# Patient Record
Sex: MALE | Race: WHITE | NOT HISPANIC OR LATINO | URBAN - METROPOLITAN AREA
[De-identification: names, ages, dates, MRNs, and addresses within clinical notes are randomized per-mention and may not be internally consistent; named-entity substitution may affect disease eponyms.]

---

## 2018-04-03 ENCOUNTER — HISTORICAL (OUTPATIENT)
Dept: RADIOLOGY | Facility: HOSPITAL | Age: 73
End: 2018-04-03

## 2019-01-28 ENCOUNTER — HISTORICAL (OUTPATIENT)
Dept: RADIOLOGY | Facility: HOSPITAL | Age: 74
End: 2019-01-28

## 2019-04-15 ENCOUNTER — HISTORICAL (OUTPATIENT)
Dept: ADMINISTRATIVE | Facility: HOSPITAL | Age: 74
End: 2019-04-15

## 2019-04-22 ENCOUNTER — HISTORICAL (OUTPATIENT)
Dept: ADMINISTRATIVE | Facility: HOSPITAL | Age: 74
End: 2019-04-22

## 2019-05-30 ENCOUNTER — HISTORICAL (OUTPATIENT)
Dept: ADMINISTRATIVE | Facility: HOSPITAL | Age: 74
End: 2019-05-30

## 2019-11-22 ENCOUNTER — HISTORICAL (OUTPATIENT)
Dept: RADIOLOGY | Facility: HOSPITAL | Age: 74
End: 2019-11-22

## 2020-02-27 ENCOUNTER — HISTORICAL (OUTPATIENT)
Dept: ADMINISTRATIVE | Facility: HOSPITAL | Age: 75
End: 2020-02-27

## 2020-03-10 ENCOUNTER — HISTORICAL (OUTPATIENT)
Dept: RADIOLOGY | Facility: HOSPITAL | Age: 75
End: 2020-03-10

## 2020-06-16 ENCOUNTER — HISTORICAL (OUTPATIENT)
Dept: SURGERY | Facility: HOSPITAL | Age: 75
End: 2020-06-16

## 2020-08-13 ENCOUNTER — HISTORICAL (OUTPATIENT)
Dept: ADMINISTRATIVE | Facility: HOSPITAL | Age: 75
End: 2020-08-13

## 2020-09-01 ENCOUNTER — HISTORICAL (OUTPATIENT)
Dept: SURGERY | Facility: HOSPITAL | Age: 75
End: 2020-09-01

## 2021-06-21 ENCOUNTER — HISTORICAL (OUTPATIENT)
Dept: ADMINISTRATIVE | Facility: HOSPITAL | Age: 76
End: 2021-06-21

## 2021-06-22 ENCOUNTER — HISTORICAL (OUTPATIENT)
Dept: ADMINISTRATIVE | Facility: HOSPITAL | Age: 76
End: 2021-06-22

## 2021-07-19 LAB
ABS NEUT (OLG): 3.29 X10(3)/MCL (ref 2.1–9.2)
BASOPHILS # BLD AUTO: 0.04 X10(3)/MCL (ref 0–0.2)
BASOPHILS NFR BLD AUTO: 0.7 % (ref 0–0.9)
BUN SERPL-MCNC: 23.6 MG/DL (ref 8.4–25.7)
CALCIUM SERPL-MCNC: 9.5 MG/DL (ref 8.8–10)
CHLORIDE SERPL-SCNC: 103 MMOL/L (ref 98–107)
CO2 SERPL-SCNC: 29 MMOL/L (ref 23–31)
CREAT SERPL-MCNC: 1.25 MG/DL (ref 0.72–1.25)
CREAT/UREA NIT SERPL: 19
EOSINOPHIL # BLD AUTO: 0.22 X10(3)/MCL (ref 0–0.9)
EOSINOPHIL NFR BLD AUTO: 3.6 % (ref 0–6.5)
ERYTHROCYTE [DISTWIDTH] IN BLOOD BY AUTOMATED COUNT: 13.2 % (ref 11.5–17)
GLUCOSE SERPL-MCNC: 93 MG/DL (ref 82–115)
HCT VFR BLD AUTO: 46.4 % (ref 42–52)
HGB BLD-MCNC: 15 GM/DL (ref 14–18)
IMM GRANULOCYTES # BLD AUTO: 0.02 10*3/UL (ref 0–0.02)
IMM GRANULOCYTES NFR BLD AUTO: 0.3 % (ref 0–0.43)
LYMPHOCYTES # BLD AUTO: 1.9 X10(3)/MCL (ref 0.6–4.6)
LYMPHOCYTES NFR BLD AUTO: 31.1 % (ref 16.2–38.3)
MCH RBC QN AUTO: 30.7 PG (ref 27–31)
MCHC RBC AUTO-ENTMCNC: 32.3 GM/DL (ref 33–36)
MCV RBC AUTO: 94.9 FL (ref 80–94)
MONOCYTES # BLD AUTO: 0.63 X10(3)/MCL (ref 0.1–1.3)
MONOCYTES NFR BLD AUTO: 10.3 % (ref 4.7–11.3)
NEUTROPHILS # BLD AUTO: 3.29 X10(3)/MCL (ref 2.1–9.2)
NEUTROPHILS NFR BLD AUTO: 54 % (ref 49.1–73.4)
NRBC BLD AUTO-RTO: 0 % (ref 0–0.2)
PLATELET # BLD AUTO: 172 X10(3)/MCL (ref 130–400)
PMV BLD AUTO: 10.4 FL (ref 7.4–10.4)
POTASSIUM SERPL-SCNC: 4.1 MMOL/L (ref 3.5–5.1)
RBC # BLD AUTO: 4.89 X10(6)/MCL (ref 4.7–6.1)
SODIUM SERPL-SCNC: 142 MMOL/L (ref 136–145)
WBC # SPEC AUTO: 6.1 X10(3)/MCL (ref 4.5–11.5)

## 2021-07-23 ENCOUNTER — HISTORICAL (OUTPATIENT)
Dept: SURGERY | Facility: HOSPITAL | Age: 76
End: 2021-07-23

## 2021-07-23 LAB — EST CREAT CLEARANCE SER (OHS): 50.59 ML/MIN

## 2021-10-11 ENCOUNTER — HISTORICAL (OUTPATIENT)
Dept: RADIOLOGY | Facility: HOSPITAL | Age: 76
End: 2021-10-11

## 2021-11-03 ENCOUNTER — HISTORICAL (OUTPATIENT)
Dept: ADMINISTRATIVE | Facility: HOSPITAL | Age: 76
End: 2021-11-03

## 2021-11-03 LAB
ABS NEUT (OLG): 3.82 X10(3)/MCL (ref 2.1–9.2)
ALBUMIN SERPL-MCNC: 3.7 GM/DL (ref 3.4–4.8)
ALBUMIN/GLOB SERPL: 1.5 RATIO (ref 1.1–2)
ALP SERPL-CCNC: 87 UNIT/L (ref 40–150)
ALT SERPL-CCNC: 5 UNIT/L (ref 0–55)
AST SERPL-CCNC: 14 UNIT/L (ref 5–34)
BASOPHILS # BLD AUTO: 0 X10(3)/MCL (ref 0–0.2)
BASOPHILS NFR BLD AUTO: 1 %
BILIRUB SERPL-MCNC: 2.1 MG/DL
BILIRUBIN DIRECT+TOT PNL SERPL-MCNC: 0.6 MG/DL (ref 0–0.5)
BILIRUBIN DIRECT+TOT PNL SERPL-MCNC: 1.5 MG/DL (ref 0–0.8)
BUN SERPL-MCNC: 22.9 MG/DL (ref 8.4–25.7)
CALCIUM SERPL-MCNC: 9.3 MG/DL (ref 8.7–10.5)
CHLORIDE SERPL-SCNC: 105 MMOL/L (ref 98–107)
CO2 SERPL-SCNC: 28 MMOL/L (ref 23–31)
CREAT SERPL-MCNC: 1.2 MG/DL (ref 0.73–1.18)
EOSINOPHIL # BLD AUTO: 0.2 X10(3)/MCL (ref 0–0.9)
EOSINOPHIL NFR BLD AUTO: 3 %
ERYTHROCYTE [DISTWIDTH] IN BLOOD BY AUTOMATED COUNT: 13.4 % (ref 11.5–17)
FERRITIN SERPL-MCNC: 114.15 NG/ML (ref 21.81–274.66)
GLOBULIN SER-MCNC: 2.4 GM/DL (ref 2.4–3.5)
GLUCOSE SERPL-MCNC: 91 MG/DL (ref 82–115)
HCT VFR BLD AUTO: 45 % (ref 42–52)
HGB BLD-MCNC: 14.4 GM/DL (ref 14–18)
IRON SATN MFR SERPL: 27 % (ref 20–50)
IRON SERPL-MCNC: 79 UG/DL (ref 65–175)
LYMPHOCYTES # BLD AUTO: 1.4 X10(3)/MCL (ref 0.6–4.6)
LYMPHOCYTES NFR BLD AUTO: 24 %
MCH RBC QN AUTO: 29.6 PG (ref 27–31)
MCHC RBC AUTO-ENTMCNC: 32 GM/DL (ref 33–36)
MCV RBC AUTO: 92.4 FL (ref 80–94)
MONOCYTES # BLD AUTO: 0.4 X10(3)/MCL (ref 0.1–1.3)
MONOCYTES NFR BLD AUTO: 7 %
NEUTROPHILS # BLD AUTO: 3.82 X10(3)/MCL (ref 2.1–9.2)
NEUTROPHILS NFR BLD AUTO: 65 %
PLATELET # BLD AUTO: 195 X10(3)/MCL (ref 130–400)
PMV BLD AUTO: 11.2 FL (ref 9.4–12.4)
POTASSIUM SERPL-SCNC: 4.4 MMOL/L (ref 3.5–5.1)
PROT SERPL-MCNC: 6.1 GM/DL (ref 5.8–7.6)
RBC # BLD AUTO: 4.87 X10(6)/MCL (ref 4.7–6.1)
SODIUM SERPL-SCNC: 142 MMOL/L (ref 136–145)
TIBC SERPL-MCNC: 210 UG/DL (ref 69–240)
TIBC SERPL-MCNC: 289 UG/DL (ref 250–450)
TRANSFERRIN SERPL-MCNC: 260 MG/DL (ref 163–344)
WBC # SPEC AUTO: 5.9 X10(3)/MCL (ref 4.5–11.5)

## 2021-11-05 ENCOUNTER — HISTORICAL (OUTPATIENT)
Dept: RADIOLOGY | Facility: HOSPITAL | Age: 76
End: 2021-11-05

## 2021-11-08 ENCOUNTER — HISTORICAL (OUTPATIENT)
Dept: ADMINISTRATIVE | Facility: HOSPITAL | Age: 76
End: 2021-11-08

## 2021-11-08 LAB
COLOR STL: NORMAL
CONSISTENCY STL: NORMAL

## 2021-11-29 ENCOUNTER — HISTORICAL (OUTPATIENT)
Dept: LAB | Facility: HOSPITAL | Age: 76
End: 2021-11-29

## 2021-11-29 LAB
ABS NEUT (OLG): 3.6 X10(3)/MCL (ref 1.5–6.9)
ERYTHROCYTE [DISTWIDTH] IN BLOOD BY AUTOMATED COUNT: 13.5 % (ref 11.5–17)
HCT VFR BLD AUTO: 42.2 % (ref 42–52)
HGB BLD-MCNC: 13.8 GM/DL (ref 14–18)
MCH RBC QN AUTO: 30 PG (ref 27–34)
MCHC RBC AUTO-ENTMCNC: 33 GM/DL (ref 31–36)
MCV RBC AUTO: 93 FL (ref 80–99)
PLATELET # BLD AUTO: 75 X10(3)/MCL (ref 140–400)
PMV BLD AUTO: 10.4 FL (ref 6.8–10)
RBC # BLD AUTO: 4.55 X10(6)/MCL (ref 4.7–6.1)
WBC # SPEC AUTO: 5.6 X10(3)/MCL (ref 4.5–11.5)

## 2022-04-10 ENCOUNTER — HISTORICAL (OUTPATIENT)
Dept: ADMINISTRATIVE | Facility: HOSPITAL | Age: 77
End: 2022-04-10
Payer: MEDICARE

## 2022-04-29 VITALS
WEIGHT: 244.69 LBS | HEIGHT: 71 IN | HEIGHT: 70 IN | BODY MASS INDEX: 34.54 KG/M2 | HEIGHT: 71 IN | BODY MASS INDEX: 34.82 KG/M2 | BODY MASS INDEX: 36.14 KG/M2 | SYSTOLIC BLOOD PRESSURE: 165 MMHG | DIASTOLIC BLOOD PRESSURE: 81 MMHG | DIASTOLIC BLOOD PRESSURE: 84 MMHG | BODY MASS INDEX: 34.26 KG/M2 | WEIGHT: 252.44 LBS | SYSTOLIC BLOOD PRESSURE: 177 MMHG | DIASTOLIC BLOOD PRESSURE: 87 MMHG | WEIGHT: 243.19 LBS | DIASTOLIC BLOOD PRESSURE: 78 MMHG | WEIGHT: 246.69 LBS | SYSTOLIC BLOOD PRESSURE: 179 MMHG | HEIGHT: 70 IN | WEIGHT: 246.69 LBS | DIASTOLIC BLOOD PRESSURE: 55 MMHG | SYSTOLIC BLOOD PRESSURE: 130 MMHG | BODY MASS INDEX: 34.54 KG/M2 | SYSTOLIC BLOOD PRESSURE: 135 MMHG | HEIGHT: 71 IN

## 2022-04-30 NOTE — OP NOTE
DATE OF SURGERY:    06/16/2020    SURGEON:  Baltazar العراقي MD    SERVICE:  Orthopedics    PREOPERATIVE DIAGNOSES:    1. Right shoulder rotator cuff tear, massive and retracted.  2. Bicipital tendon tearing, superior labrum anterior and posterior tear, subacromial impingement.    POSTOPERATIVE DIAGNOSES:    1. Right shoulder rotator cuff tear, massive and retracted.  2. Bicipital tendon tearing, superior labrum anterior and posterior tear, subacromial impingement.    PROCEDURES:    1. Right shoulder arthroscopy with debridement of unstable labral tear.  2. Arthroscopic biceps tenotomy.  3. Mini open rotator cuff repair, massive and retracted, supraspinatus and infraspinatus tendons.  4. Mini open right shoulder subacromial decompression.    ANESTHESIA:  LMA.    IV FLUIDS:  Per Anesthesia.    ESTIMATED BLOOD LOSS:  Less than 20 cc.    COUNTS:  Correct.    COMPLICATIONS:  None.    IMPLANTS:  Arthrex suture anchors x3.    DISPOSITION:  Stable to PACU.    INDICATIONS FOR PROCEDURE:  Mr. Vazquez is a 75-year-old male with continued pain and loss of motion of his right shoulder.  He has failed multiple conservative treatments.  He had an MRI that demonstrated the above findings.  Risks, benefits, alternatives discussed with the patient in detail.  All questions were answered.  Informed consent was obtained.    PROCEDURE IN DETAIL:  Patient found in preoperative holding by Anesthesia and found fit for surgery.  He was taken to the operating room, placed on the operating table in supine position.  All bony prominences well padded.  Time-out was called to identify the correct patient, correct procedure, correct site.  All were in agreement.  Patient underwent LMA anesthesia without complications.  He was then prepped and draped in normal sterile fashion, leaving the right upper extremity exposed for surgery.  He was in the modified beach chair position.  He received his preoperative antibiotics.  Next, through the  posterior portal with good backflow, a 1 cm incision was made.  Camera was introduced in glenohumeral joint, and after this was done, the anterior portal was then made with a 1 cm incision just lateral to the tip of the coracoid.  Next, examination of the glenohumeral joint demonstrated significant glenohumeral cartilage changes.  He also had degenerative tearing circumferentially of the labrum.  With use of 3.5 shaver, patient underwent a debridement of this area.  Next, he had tearing throughout the intraarticular portion of the biceps tendon.  Given this, patient underwent a biceps tenotomy.  He had good retraction of the tendon outside the glenohumeral joint.  Attention was turned laterally, where patient had a massive retracted rotator cuff tear involving the supraspinatus and infraspinatus tendons.  He also had some tearing of the subscapularis tendon.  Inferior recess was examined, and no loose bodies.  Given the massive retracted rotator cuff tear, a 3 cm mini open incision was made over the anterolateral aspect of the right shoulder.  Soft tissue dissection taken down to the deltoid, where it was splint in line with its fibers, careful not to go greater than 3 cm past the tip of the acromion.  Next, the subacromial space was identified.  A large amount of bursal tissue was removed.  He had scraping on the undersurface of the acromion to reduce his impingement.  Next, the rotator cuff was identified.  For the most part, it was retracted in the back of the shoulder.  With use of a Key elevator, it was brought more anteriorly.  Next, with use of 3 suture anchors by Arthrex, the rotator cuff was repaired back over the humeral head.  He did have appropriate coverage over the humeral head.  The tendon, though, appeared to be very weak.  After repair of his massive retracted rotator cuff tear, there were no additional tears posteriorly.  Hemostasis was achieved.  Copious irrigation used to wash the wound.  The  fascia was closed with 0 Vicryl, subcutaneous tissue closed with 2-0 Vicryl.  Skin was closed with 3-0 Monocryl suture.  Mastisol, Steri-Strips, Xeroform, 4 x 4's, soft tissue dressing, a shoulder abduction sling were placed on the right upper extremity.  He was awoken by Anesthesia and brought to PACU in stable condition.        ______________________________  MD YADIRA Tavarez/MIRIAM  DD:  06/16/2020  Time:  09:43AM  DT:  06/16/2020  Time:  10:15AM  Job #:  [id number]

## 2022-04-30 NOTE — OP NOTE
DATE OF SURGERY:    07/23/2021    SURGEON:  Baltazar العراقي MD    SERVICE:  Orthopedics    PREOPERATIVE DIAGNOSIS:  Left shoulder full-thickness and retracted rotator cuff tear, bicipital tendinitis with possible tearing, labral tear, subacromial impingement.    POSTOPERATIVE DIAGNOSIS:  Left shoulder full-thickness and retracted rotator cuff tear, bicipital tendinitis with possible tearing, labral tear, subacromial impingement.    PROCEDURES:    1. Left shoulder arthroscopy with debridement of labral tear and remaining biceps tendon.  2. Mini open massive rotator cuff repair, full thickness, retracted.  3. Mini open subacromial decompression, left shoulder.    ANESTHESIA:  LMA.    IV FLUIDS:  See Anesthesia.    ESTIMATED BLOOD LOSS:  Less than 50 cc.    COUNTS:  Correct.    COMPLICATIONS:  None.    IMPLANTS:  Arthrex suture anchor x3.    DISPOSITION:  Stable to PACU.    INDICATIONS FOR PROCEDURE:  Mr. Vazquez is a 76-year-old male with continued pain and a positive drop-arm for his left shoulder.  He has failed conservative treatment.  He had an MRI demonstrating the above findings.  Risks, benefits, alternatives discussed with the patient in detail.  All questions were answered.  Informed consent was obtained.    PROCEDURE IN DETAIL:  Patient was found in preoperative holding by Anesthesia and found fit for surgery.  He was taken to the operating room, placed on the operating table in supine position.  All bony prominences were well padded.  Time-out was called to identify correct patient, correct procedure, correct side.  All were in agreement.  Patient underwent LMA anesthesia without any complications.  He was then prepped and draped in a normal sterile fashion, leaving the left upper extremity exposed for surgery.  He was in the modified beach chair position.  He received his preoperative antibiotics.  Next, through the posterior portal with good backflow, a 1 cm incision was made.  Camera was introduced  into the glenohumeral joint.  After this was done, an anterior portal was then made through an incision just lateral to the tip of the coracoid.  Next, examination of the glenohumeral joint demonstrated significant arthritis, both on the glenoid and humeral head side.  He had a massive retracted rotator cuff tear, supraspinatus and infraspinatus.  His tendon was pulled back near the glenohumeral line.  He also had significant tearing of the labrum, both anteriorly, superiorly, and posteriorly.  With use of 3.5 shaver, patient underwent debridement of the unstable tearing.  There was no gross instability.  He did have a full-thickness biceps tendon tear, which his remnant was debrided.  Given the massive and retracted rotator cuff tear, a mini open incision was made over the anterolateral aspect of the left shoulder.  Soft tissue dissection down to the deltoid, where it was split in line with its fibers.  Care was taken not to go past 3 cm past the tip of the acromion.  Next, the subacromial space was identified.  Large amount of bursal tissue was removed.  He did have scraping of the undersurface of the acromion.  After this was done, the massive and retracted rotator cuff tears were brought more forward with use of Allis clamp.  With use of Key elevator, it was then loosened.  Next, with use of 3 Arthrex anchors, the rotator cuff was repaired back to the humeral head.  He did have acceptable position, coverage over the humeral head.  He did have some poor tissue quality, though.  There were no additional tears appreciated.  He was stable to stressing.  After this was done, hemostasis was achieved.  Copious irrigation was used to wash the wound.  The fascia was closed with 0 Vicryl.  Subcutaneous tissue closed with 2-0 Vicryl.  Skin was closed with 3-0 Monocryl suture.  Mastisol, Steri-Strips, Xeroform, 4 x 4's, soft tissue dressing, and a shoulder abduction sling were placed on left upper extremity.  He was awoken  by Anesthesia and then brought to PACU in stable condition.        ______________________________  MD YADIRA Tavarez/MIRIAM  DD:  07/26/2021  Time:  08:08AM  DT:  07/26/2021  Time:  08:33AM  Job #:  [id number]

## 2022-05-02 NOTE — HISTORICAL [facility]
This is a historical note converted from [facility]. Formatting and pictures may have been removed.  Please reference [place] for original formatting and attached multimedia. Chief Complaint  Post-Op. Left TKA 5/14/19 Gl 8/12/19. Patient states hes doing great.  History of Present Illness  Patient comes in today for his first postop visit. ?Patient states he is doing very well. ?He states he is able to bend his knee to 105 degrees of physical therapy today.  Physical Exam  Vitals & Measurements  HR:?86(Peripheral)? BP:?135/55?  HT:?180?cm? WT:?111.9?kg? BMI:?34.54?  Left lower externally form soft and warm. ?Skin is intact and there are no signs or symptoms of DVT or infection his incision is healed her staples are removed?he has full extension he is able to flex 105 degrees he is stable to stressing. ?His patella is tracking appropriately he is neurovascular intact distally.? X-rays 3 views left knee demonstrate a well aligned total knee arthroplasty.  Assessment/Plan  1.?History of total left knee replacement?Z96.652  ? At this time we discussed his physical exam and x-ray findings. ?He is healing nicely. ?He will continue physical therapy to regain his full strength and motion. ?He will finish out his aspirin. ?I would like to see him back in 4 weeks to see how he is progressing.  Ordered:  Clinic Follow up, *Est. 06/27/19 3:00:00 CDT, Order for future visit, History of total left knee replacement, [facility]  Post-Op follow-up visit [id number] , History of total left knee replacement, [facility], 05/30/19 13:15:00 CDT  ?  Orders:  acetaminophen-HYDROcodone, 1 tab(s), Oral, q6hr, PRN PRN as needed for pain, # 28 tab(s), 0 Refill(s), Pharmacy: [id number]  XR Knee Left 3 Views, Routine, 05/30/19 12:59:00 CDT, Pain, None, Ambulatory, M17.12, Not Scheduled, 05/30/19 12:59:00 CDT  Referrals  Clinic Follow up, *Est. 06/27/19 3:00:00 CDT, Order for future visit, History of total left knee replacement,  [facility]   Problem List/Past Medical History  Ongoing  Heart murmur  Obesity  Historical  Diabetes mellitus  Heart  Hypertension  Procedure/Surgical History  JOSHUA BOSTON Total Knee Arthroplasty (Left) (05/14/2019)  Replacement of Left Knee Joint with Synthetic Substitute, Cemented, Open Approach (05/14/2019)  Robotic Assisted Procedure of Lower Extremity, Open Approach (05/14/2019)  Leg  Tonsillectomy  Torn meniscus   Medications  alfuzosin 10 mg oral tablet, extended release, 10 mg= 1 tab(s), Oral, Daily  aspirin 81 mg oral Delayed Release (EC) tablet, 81 mg= 1 tab(s), Oral, BID,? ?Not taking  Colace 100 mg oral capsule, 200 mg= 2 cap(s), Oral, Daily,? ?Not taking  hydrochlorothiazide-losartan 25 mg-100 mg oral tablet, 1 tab(s), Oral, Daily  levoFLOXacin 500 mg oral tablet, 500 mg= 1 tab(s), Oral, Daily,? ?Not taking  meloxicam 7.5 mg oral tablet, 7.5 mg= 1 tab(s), Oral, Daily,? ?Not taking  metFORMIN 500 mg oral tablet, extended release, 500 mg= 1 tab(s), Oral, Daily  metoprolol succinate 50 mg oral tablet, extended release, 50 mg= 1 tab(s), Oral, Daily  Mobic 7.5 mg oral tablet, 7.5 mg= 1 tab(s), Oral, Daily,? ?Not taking  nitrofurantoin macrocrystals-monohydrate 100 mg oral capsule, 100 mg= 1 cap(s), Oral, BID,? ?Not taking  Norco 10 mg-325 mg oral tablet, 1 tab(s), Oral, q6hr, PRN  Norco 10 mg-325 mg oral tablet, 1 tab(s), Oral, q6hr, PRN  Zofran ODT 8 mg oral tablet, disintegrating, 8 mg= 1 tab(s), Oral, q8hr, PRN,? ?Not taking: Last Dose Date/Time Unknown  Allergies  penicillin?(Shortness of breath)  Social History  Alcohol  Current, Liquor, Daily, 05/08/2019  Current, 04/15/2019  Employment/School  Retired, 05/08/2019  Home/Environment  Lives with Spouse., 05/08/2019  Nutrition/Health  Diabetic, 05/08/2019  Substance Abuse  Never, 05/08/2019  Tobacco  Never (less than 100 in lifetime), No, 05/30/2019  Family History  Stroke: Mother and Father.  Health Maintenance  Health Maintenance  ???Pending?(in the  next year)  ??? ??OverDue  ??? ? ? ?Advance Directive due??01/01/19??and every 1??year(s)  ??? ? ? ?Alcohol Misuse Screening due??01/01/19??and every 1??year(s)  ??? ? ? ?Cognitive Screening due??01/01/19??and every 1??year(s)  ??? ? ? ?Geriatric Depression Screening due??01/01/19??and every 1??year(s)  ??? ??Due?  ??? ? ? ?ADL Screening due??05/30/19??and every 1??year(s)  ??? ? ? ?Colorectal Screening ([facility]) due??05/30/19??and every?  ??? ? ? ?Pneumococcal Vaccine due??05/30/19??Variable frequency  ??? ? ? ?Pneumococcal Vaccine due??05/30/19??and every?  ??? ? ? ?Tetanus Vaccine due??05/30/19??and every 10??year(s)  ??? ? ? ?Zoster Vaccine due??05/30/19??and every 100??year(s)  ??? ??Due In Future?  ??? ? ? ?Fall Risk Assessment not due until??01/01/20??and every 1??year(s)  ??? ? ? ?Functional Assessment not due until??01/01/20??and every 1??year(s)  ??? ? ? ?Obesity Screening not due until??01/01/20??and every 1??year(s)  ??? ? ? ?Aspirin Therapy for CVD Prevention not due until??05/16/20??and every 1??year(s)  ??? ? ? ?Hypertension Management-BMP not due until??05/18/20??and every 1??year(s)  ??? ? ? ?Hypertension Management-Blood Pressure not due until??05/29/20??and every 1??year(s)  ???Satisfied?(in the past 1 year)  ??? ??Satisfied?  ??? ? ? ?Aspirin Therapy for CVD Prevention on??05/16/19.??Satisfied by Baltazar العراقي MD.  ??? ? ? ?Blood Pressure Screening on??05/30/19.??Satisfied by Chasidy Stanley  ??? ? ? ?Body Mass Index Check on??05/30/19.??Satisfied by Chasidy Stanley  ??? ? ? ?Diabetes Maintenance-HgbA1c on??05/08/19.??Satisfied by Anibal Ann  ??? ? ? ?Diabetes Screening on??05/19/19.??Satisfied by Kenya Burr  ??? ? ? ?Fall Risk Assessment on??05/19/19.??Satisfied by Hadley ROBIN, Syeda MATTHEW  ??? ? ? ?Functional Assessment on??05/30/19.??Satisfied by Chasidy Stanley  ??? ? ? ?Hypertension Management-BMP on??05/19/19.??Satisfied by Kenya Burr  Annabella  ??? ? ? ?Hypertension Management-Blood Pressure on??05/30/19.??Satisfied by Chasidy Stanley  ??? ? ? ?Obesity Screening on??05/30/19.??Satisfied by Chasidy Stanley  ?  ?

## 2022-05-02 NOTE — HISTORICAL [facility]
This is a historical note converted from Dorian. Formatting and pictures may have been removed.  Please reference [facility] for original formatting and attached multimedia. Chief Complaint  6 month f/u left tka 5.14.2019 gl 8.12.2019 . pt states he is doing well.  History of Present Illness  Patient returns today for repeat exam. ?Patient is very happy with his left total knee arthroplasty. ?He does complain of right shoulder pain and loss of motion.? He has pain especially with overhead activities lifting, he denies any?numbness or tingling. ?He is tried rest medication?and exercises without relief.  Physical Exam  Vitals & Measurements  HR:?73(Peripheral)? BP:?177/84?  HT:?180?cm? WT:?111.9?kg? BMI:?34.54?  Left lower externally compartment soft and warm. ?Skin is intact with no signs or symptoms of DVT or infection his incision well-healed his motion is 0 to 120 degrees he is stable to stressing he is neurovascular tact distally. ?Examination the right shoulder?he is point tender along the anterolateral aspect positive Morris positive to can sign positive drop arm he is able to forward flex and abduct?80 degrees with pain discomfort passive range of motion to 140 he is stable to stressing he is neurovascular tact distally. ?X-rays?3 views the right shoulder demonstrates underlying impingement  Assessment/Plan  1.?History of total left knee replacement?Z96.652  ?At this time we discussed his physical exam and x-ray findings. ?We will proceed with an MRI of his right shoulder.? I would like to see him back later this week for his results.  2.?Right rotator cuff tear?M75.101  3.?Shoulder impingement?M75.40  Referrals  Clinic Follow up, *Est. 02/27/21 3:00:00 CST, Order for future visit, History of total left knee replacement, [facility]  Clinic Follow up, *Est. 03/05/20 3:00:00 CST, Order for future visit, Right rotator cuff tear  Shoulder impingement, [facility]   Problem List/Past Medical  History  Ongoing  Heart murmur  Obesity  Historical  Diabetes mellitus  Heart  Hypertension  Procedure/Surgical History  Repair Face Subcutaneous Tissue and Fascia, Open Approach (02/08/2020)  Repair Left Ear Skin, External Approach (02/08/2020)  Simple repair of superficial wounds of face, ears, eyelids, nose, lips and/or mucous membranes; 7.6 cm to 12.5 cm (02/08/2020)  JOSHUA BOSTON Total Knee Arthroplasty (Left) (05/14/2019)  Replacement of Left Knee Joint with Synthetic Substitute, Cemented, Open Approach (05/14/2019)  Robotic Assisted Procedure of Lower Extremity, Open Approach (05/14/2019)  Leg  Tonsillectomy  Torn meniscus   Medications  acetaminophen-hydrocodone 325 mg-5 mg oral tablet, 1 tab(s), Oral, q6hr,? ?Not Taking, Completed Rx  acetaminophen-oxycodone 325 mg-5 mg oral tablet, 1 tab(s), Oral, QID,? ?Not Taking, Completed Rx  alfuzosin 10 mg oral tablet, extended release, 10 mg= 1 tab(s), Oral, Daily  aspirin 81 mg oral Delayed Release (EC) tablet, 81 mg= 1 tab(s), Oral, BID,? ?Not taking  Colace 100 mg oral capsule, 200 mg= 2 cap(s), Oral, Daily,? ?Not taking  hydrochlorothiazide 25 mg oral tablet, 25 mg= 1 tab(s), Oral, Daily  hydrochlorothiazide-losartan 25 mg-100 mg oral tablet, 1 tab(s), Oral, Daily  levoFLOXacin 500 mg oral tablet, 500 mg= 1 tab(s), Oral, Daily,? ?Not taking  losartan 100 mg oral tablet, 100 mg= 1 tab(s), Oral, Daily  meloxicam 7.5 mg oral tablet, 7.5 mg= 1 tab(s), Oral, Daily,? ?Not taking  metFORMIN 500 mg oral tablet, extended release, 500 mg= 1 tab(s), Oral, Daily  methocarbamol 750 mg oral tablet, 750 mg= 1 tab(s), Oral, TID  metoprolol succinate 50 mg oral tablet, extended release, 50 mg= 1 tab(s), Oral, Daily  Mobic 7.5 mg oral tablet, 7.5 mg= 1 tab(s), Oral, Daily,? ?Not taking  nitrofurantoin macrocrystals-monohydrate 100 mg oral capsule, 100 mg= 1 cap(s), Oral, BID,? ?Not taking  Norco 10 mg-325 mg oral tablet, 1 tab(s), Oral, q6hr, PRN,? ?Not taking  Norco 10 mg-325 mg  oral tablet, 1 tab(s), Oral, q6hr, PRN,? ?Not Taking, Completed Rx: PRN  Zofran ODT 8 mg oral tablet, disintegrating, 8 mg= 1 tab(s), Oral, q8hr, PRN,? ?Not taking: Last Dose Date/Time Unknown  Allergies  morphine?(Nausea and vomiting)  penicillin?(Shortness of breath)  Social History  Abuse/Neglect  No, No, Yes, 02/27/2020  No, 02/08/2020  No, 08/22/2019  Alcohol  Current, Liquor, 1-2 times per week, 02/08/2020  Current, Liquor, Daily, 05/08/2019  Current, 04/15/2019  Employment/School  Retired, 05/08/2019  Home/Environment  Lives with Spouse., 05/08/2019  Nutrition/Health  Diabetic, 05/08/2019  Substance Use  Never, 05/08/2019  Tobacco  Never (less than 100 in lifetime), N/A, 02/27/2020  Never (less than 100 in lifetime), N/A, 02/08/2020  Never (less than 100 in lifetime), No, 08/22/2019  Family History  Stroke: Mother and Father.  Immunizations  Vaccine Date Status   tetanus/diphtheria/pertussis, acel(Tdap) 02/08/2020 Given   Health Maintenance  Health Maintenance  ???Pending?(in the next year)  ??? ??OverDue  ??? ? ? ?Advance Directive due??01/01/20??and every 1??year(s)  ??? ? ? ?Geriatric Depression Screening due??01/01/20??and every 1??year(s)  ??? ??Due?  ??? ? ? ?Cognitive Screening due??01/01/20??and every 1??year(s)  ??? ? ? ?Fall Risk Assessment due??01/01/20??and every 1??year(s)  ??? ? ? ?Functional Assessment due??01/01/20??and every 1??year(s)  ??? ? ? ?ADL Screening due??03/02/20??and every 1??year(s)  ??? ? ? ?Medicare Annual Wellness Exam due??03/02/20??and every 1??year(s)  ??? ? ? ?Pneumococcal Vaccine due??03/02/20??Variable frequency  ??? ? ? ?Pneumococcal Vaccine due??03/02/20??and every?  ??? ? ? ?Zoster Vaccine due??03/02/20??and every 100??year(s)  ??? ??Due In Future?  ??? ? ? ?Aspirin Therapy for CVD Prevention not due until??05/16/20??and every 1??year(s)  ??? ? ? ?Hypertension Management-BMP not due until??05/18/20??and every 1??year(s)  ??? ? ? ?Obesity Screening not due  until??01/01/21??and every 1??year(s)  ??? ? ? ?Hypertension Management-Blood Pressure not due until??02/26/21??and every 1??year(s)  ???Satisfied?(in the past 1 year)  ??? ??Satisfied?  ??? ? ? ?Advance Directive on??05/14/19.??Satisfied by Holly Sommer RN  ??? ? ? ?Aspirin Therapy for CVD Prevention on??05/16/19.??Satisfied by Baltazar العراقي MD  ??? ? ? ?Blood Pressure Screening on??02/27/20.??Satisfied by Twyla Pantoja LPN.  ??? ? ? ?Body Mass Index Check on??02/27/20.??Satisfied by Twyla Pantoja LPN.  ??? ? ? ?Diabetes Maintenance-HgbA1c on??05/08/19.??Satisfied by Anibal Ann  ??? ? ? ?Diabetes Screening on??05/19/19.??Satisfied by Kenya Burr  ??? ? ? ?Fall Risk Assessment on??05/19/19.??Satisfied by Syeda Butcher RN  ??? ? ? ?Functional Assessment on??05/30/19.??Satisfied by Chasidy Stanley  ??? ? ? ?Hypertension Management-Blood Pressure on??02/27/20.??Satisfied by Twyla Pantoja LPN  ??? ? ? ?Obesity Screening on??02/27/20.??Satisfied by Twyla Pantoja LPN  ??? ? ? ?Tetanus Vaccine on??02/08/20.??Satisfied by Cele Otero LPN  ?

## 2022-05-02 NOTE — HISTORICAL [facility]
This is a historical note converted from Dorian. Formatting and pictures may have been removed.  Please reference Dorian for original formatting and attached multimedia. Chief Complaint  right shoulder pain, fell approx 2 weeks pain is not getting any better. ROM limited. going to PT 2 times a week. patient is afraid he reinjured his right shoudler. ?on 6.16.20 had rtc/slap. (pt is requesting xray)  History of Present Illness  Patient comes in today complaint of pain and loss of motion of his right shoulder. ?He states 2 weeks ago he fell, injuring his right shoulder. ?He had a previous rotator cuff repair?2 months ago. ?He continues to have pain and loss of motion. ?He also?broke up a fight between 2 dogs, and felt a pulling in his shoulder.? He has not been able to return to his?postsurgical activities.  Physical Exam  Vitals & Measurements  T:?97.1? ?F (Oral)? HR:?66(Peripheral)? BP:?179/87?  HT:?178.00?cm? WT:?114.500?kg? BMI:?36.14?  Right upper extremity compartment soft and warm. ?Skin is intact with no signs symptoms of DVT or infection he is?having tenderness along the anterolateral aspect of the right shoulder. ?Positive Morris positive can sign questionable drop arm is able to forward flex and abduct 85 degrees with discomfort, this is greatly decreased since his surgery. ?He is otherwise neurovascular tact distally.? X-rays 3 views right shoulder demonstrate no obvious fracture dislocation.  Assessment/Plan  1.?Right rotator cuff tear?M75.101  ?At this time we discussed his physical exam and x-ray findings. ?I am concerned he has a re-tear of his rotator cuff given his new injuries. ?We will proceed with an MRI of his right shoulder. ?He will continue?low impact activities, I will call him with his results.  Ordered:  MRI Ext Upper Joint Right W/O Contrast, Routine, 08/13/20 8:05:00 CDT, Other (please specify), Shoulder pain, rotator cuff disorder suspected, nondiagnostic xray, None, Ambulatory, pt  requesting open MRI, Rad Type, Order for future visit, Right rotator cuff tear, Schedule this test, Outside...  Post-Op follow-up visit [id number] PC, Right rotator cuff tear, [facility], 08/13/20 8:05:00 CDT  ?   Problem List/Past Medical History  Ongoing  Diabetes mellitus  Heart murmur  Hypertension  Obesity  Historical  Heart  Procedure/Surgical History  Arthroscopy of Shoulder with Rotator Cuff Repair (Right) (06/16/2020)  Arthroscopy, shoulder, surgical; biceps tenodesis (06/16/2020)  Injection(s), anesthetic agent(s) and/or steroid; brachial plexus (06/16/2020)  Introduction of Anesthetic Agent into Peripheral Nerves and Plexi, Percutaneous Approach (06/16/2020)  Repair of ruptured musculotendinous cuff (eg, rotator cuff) open; acute (06/16/2020)  Repair Right Shoulder Tendon, Open Approach (06/16/2020)  Reposition Right Upper Arm Tendon, Percutaneous Endoscopic Approach (06/16/2020)  Repair Face Subcutaneous Tissue and Fascia, Open Approach (02/08/2020)  Repair Left Ear Skin, External Approach (02/08/2020)  Simple repair of superficial wounds of face, ears, eyelids, nose, lips and/or mucous membranes; 7.6 cm to 12.5 cm (02/08/2020)  JOSHUA BOSTON Total Knee Arthroplasty (Left) (05/14/2019)  Replacement of Left Knee Joint with Synthetic Substitute, Cemented, Open Approach (05/14/2019)  Robotic Assisted Procedure of Lower Extremity, Open Approach (05/14/2019)  Leg  Tonsillectomy  Torn meniscus   Medications  acetaminophen-hydrocodone 325 mg-5 mg oral tablet, 1 tab(s), Oral, q6hr,? ?Not Taking, Completed Rx: Last Dose Date/Time Unknown  acetaminophen-oxycodone 325 mg-5 mg oral tablet, 1 tab(s), Oral, QID,? ?Not Taking, Completed Rx: Last Dose Date/Time Unknown  alfuzosin 10 mg oral tablet, extended release, 10 mg= 1 tab(s), Oral, Daily,? ?Not Taking, Completed Rx: Last Dose Date/Time Unknown  aspirin 81 mg oral Delayed Release (EC) tablet, 81 mg= 1 tab(s), Oral, BID,? ?Not taking: Last Dose Date/Time  Unknown  azithromycin 250 mg oral tablet, Oral,? ?Not Taking, Completed Rx: Last Dose Date/Time Unknown  Colace 100 mg oral capsule, 200 mg= 2 cap(s), Oral, Daily,? ?Not taking: Last Dose Date/Time Unknown  hydrochlorothiazide 25 mg oral tablet, 25 mg= 1 tab(s), Oral, Daily,? ?Not Taking, Completed Rx: Last Dose Date/Time Unknown now taking lis w/hctz  hydrochlorothiazide-losartan 25 mg-100 mg oral tablet, 1 tab(s), Oral, Daily  levoFLOXacin 500 mg oral tablet, 500 mg= 1 tab(s), Oral, Daily,? ?Not taking: Last Dose Date/Time Unknown  losartan 100 mg oral tablet, 100 mg= 1 tab(s), Oral, Daily,? ?Not Taking, Completed Rx: Last Dose Date/Time Unknown taking lis/w/hctz  meloxicam 7.5 mg oral tablet, 7.5 mg= 1 tab(s), Oral, Daily  metFORMIN 500 mg oral tablet, extended release, 500 mg= 1 tab(s), Oral, Daily  methocarbamol 750 mg oral tablet, 750 mg= 1 tab(s), Oral, TID,? ?Not Taking, Completed Rx: Last Dose Date/Time Unknown  metoprolol succinate 50 mg oral tablet, extended release, 50 mg= 1 tab(s), Oral, Daily  Mobic 7.5 mg oral tablet, 7.5 mg= 1 tab(s), Oral, Daily,? ?Not taking: Last Dose Date/Time Unknown duplicate  nitrofurantoin macrocrystals-monohydrate 100 mg oral capsule, 100 mg= 1 cap(s), Oral, BID,? ?Not taking: Last Dose Date/Time Unknown  Norco 10 mg-325 mg oral tablet, 1 tab(s), Oral, q6hr, PRN,? ?Not Taking, Completed Rx: Last Dose Date/Time Unknown  Norco 10 mg-325 mg oral tablet, 1 tab(s), Oral, q6hr, PRN,? ?Not Taking, Completed Rx  Norco 10 mg-325 mg oral tablet, 1 tab(s), Oral, q6hr, PRN,? ?Not Taking, Completed Rx: PRN Last Dose Date/Time Unknown  Zofran ODT 8 mg oral tablet, disintegrating, 8 mg= 1 tab(s), Oral, q8hr, PRN,? ?Not taking: Last Dose Date/Time Unknown  Allergies  morphine?(Nausea and vomiting)  penicillin?(Shortness of breath)  Social History  Abuse/Neglect  No, No, Yes, 08/13/2020  Alcohol  Current, Liquor, 1-2 times per week, 02/08/2020  Current, Liquor, Daily, 05/08/2019  Current,  04/15/2019  Employment/School  Retired, 05/08/2019  Home/Environment  Lives with Spouse., 05/08/2019  Nutrition/Health  Diabetic, 05/08/2019  Substance Use  Never, 05/08/2019  Tobacco  Never (less than 100 in lifetime), N/A, 08/13/2020  Family History  Stroke: Mother and Father.  Immunizations  Vaccine Date Status   tetanus/diphtheria/pertussis, acel(Tdap) 02/08/2020 Given   Health Maintenance  Health Maintenance  ???Pending?(in the next year)  ??? ??OverDue  ??? ? ? ?Diabetes Maintenance-Microalbumin due??and every?  ??? ? ? ?Advance Directive due??01/02/20??and every 1??year(s)  ??? ? ? ?Cognitive Screening due??01/02/20??and every 1??year(s)  ??? ? ? ?Aspirin Therapy for CVD Prevention due??05/16/20??and every 1??year(s)  ??? ??Due?  ??? ? ? ?ADL Screening due??08/14/20??and every 1??year(s)  ??? ? ? ?Depression Screening due??08/14/20??and every?  ??? ? ? ?Diabetes Maintenance-Eye Exam due??08/14/20??and every?  ??? ? ? ?Diabetes Maintenance-Fasting Lipid Profile due??08/14/20??Variable frequency  ??? ? ? ?Diabetes Maintenance-Foot Exam due??08/14/20??and every?  ??? ? ? ?Hypertension Management-Education due??08/14/20??and every 1??year(s)  ??? ? ? ?Influenza Vaccine due??08/14/20??and every?  ??? ? ? ?Lipid Screening due??08/14/20??and every?  ??? ? ? ?Medicare Annual Wellness Exam due??08/14/20??and every 1??year(s)  ??? ? ? ?Pneumococcal Vaccine due??08/14/20??and every?  ??? ? ? ?Zoster Vaccine due??08/14/20??and every?  ??? ??Due In Future?  ??? ? ? ?Diabetes Maintenance-HgbA1c not due until??08/21/20??and every 1??year(s)  ??? ? ? ?Obesity Screening not due until??01/01/21??and every 1??year(s)  ??? ? ? ?Fall Risk Assessment not due until??01/02/21??and every 1??year(s)  ??? ? ? ?Functional Assessment not due until??01/02/21??and every 1??year(s)  ??? ? ? ?Hypertension Management-BMP not due until??06/04/21??and every 1??year(s)  ??? ? ? ?Diabetes Maintenance-Serum Creatinine not due until??06/04/21??and  every 1??year(s)  ??? ? ? ?Blood Pressure Screening not due until??07/23/21??and every 1??year(s)  ??? ? ? ?Body Mass Index Check not due until??07/23/21??and every 1??year(s)  ??? ? ? ?Hypertension Management-Blood Pressure not due until??07/23/21??and every 1??year(s)  ???Satisfied?(in the past 1 year)  ??? ??Satisfied?  ??? ? ? ?Blood Pressure Screening on??08/13/20.??Satisfied by Twyla Pantoja LPN.  ??? ? ? ?Body Mass Index Check on??08/13/20.??Satisfied by Twyla Pantoja LPN.  ??? ? ? ?Diabetes Maintenance-Serum Creatinine on??06/04/20.??Satisfied by Radha Trejo  ??? ? ? ?Diabetes Screening on??06/04/20.??Satisfied by Radha Trejo  ??? ? ? ?Fall Risk Assessment on??06/25/20.??Satisfied by Bernadette Saldivar LPN  ??? ? ? ?Functional Assessment on??06/16/20.??Satisfied by Zena Zurita RN  ??? ? ? ?Hypertension Management-Blood Pressure on??08/13/20.??Satisfied by Twyla Pantoja LPN  ??? ? ? ?Obesity Screening on??08/13/20.??Satisfied by Twyla Pantoja LPN  ??? ? ? ?Tetanus Vaccine on??02/08/20.??Satisfied by Cele Otero RN  ?

## 2022-05-02 NOTE — HISTORICAL [facility]
This is a historical note converted from Dorian. Formatting and pictures may have been removed.  Please reference Dorian for original formatting and attached multimedia. Chief Complaint  Left shoulder injury, carrying heavy object and stumbled and injured self, went to ER on 06/21/2021, pt also c/o L hip pain, taking narco 7.5, xr L hip today  History of Present Illness  Patient comes in today complaint of left shoulder pain. ?Patient was lifting up an approximate 40 pound heavy object?when he tripped,?he injured his left shoulder. ?He was seen in the ER earlier today. ?He is unable to raise his shoulder. ?He also states he fell about 3 weeks ago, on his left hip, continues to have pain?with weightbearing  Physical Exam  Left upper extremity compartment soft and warm. ?Skin is intact with no signs or symptoms of DVT or infection. ?He is point tender along the anterolateral aspect left shoulder positive Morris positive can sign positive drop arm is able to forward flex and abduct?about 30 degrees with pain and discomfort. ?Examination left hip he is point tender on the lateral and anterior aspect of the left hip. ?He does have pain with resisted hip flexion?he has no pain with logroll of the left hip there is no long track signs he walks with a slight hesitant gait he is neurovascular tact distally.? X-rays 2 views left hip demonstrates a questionable radiolucent line in the?intertrochanteric region.  Assessment/Plan  1.?Left hip pain?M25.552,?Left hip pain?M25.552  ?At this time we discussed his physical exam and x-ray findings. ?We have discussed his outside x-rays of the?shoulder as well.? We have discussed protecting his weightbearing, we will proceed with an MRI of his left shoulder for a suspected full-thickness rotator cuff tear as well as an MRI of his left hip, suspected?stress fracture, he is 3 weeks from his left hip injury.? Like to see him back later this week for his results.  Ordered:  1036F Current  tobacco non-user, 06/21/21 16:04:00 CDT  1111F- Medication reconciliation completed within 30 days of an inpatient discharge to home, 06/21/21 16:04:00 CDT  MRI Ext Lower Joint Left W/O Contrast, Routine, 06/21/21 16:05:00 CDT, Other (please specify), None, Ambulatory, mri left hip - stress fx, Rad Type, Order for future visit, Left hip pain  Stress fracture of hip  Left rotator cuff tear, Schedule this test, [facility]  MRI Ext Upper Joint Left W/O Contrast, Routine, 06/21/21 16:04:00 CDT, Other (please specify), None, Ambulatory, mri left shoulder- rtc tear, Rad Type, Order for future visit, Left hip pain  Stress fracture of hip  Left rotator cuff tear, Schedule this test, [facility]  Office/Outpatient Visit Level 4 Established [id number] PC, Left hip pain  Stress fracture of hip  Left rotator cuff tear, [facility], 06/21/21 16:04:00 CDT  ?  2.?Stress fracture of hip?M84.359A  Ordered:  1036F Current tobacco non-user, 06/21/21 16:04:00 CDT  1111F- Medication reconciliation completed within 30 days of an inpatient discharge to home, 06/21/21 16:04:00 CDT  MRI Ext Lower Joint Left W/O Contrast, Routine, 06/21/21 16:05:00 CDT, Other (please specify), None, Ambulatory, mri left hip - stress fx, Rad Type, Order for future visit, Left hip pain  Stress fracture of hip  Left rotator cuff tear, Schedule this test, [facility]  MRI Ext Upper Joint Left W/O Contrast, Routine, 06/21/21 16:04:00 CDT, Other (please specify), None, Ambulatory, mri left shoulder- rtc tear, Rad Type, Order for future visit, Left hip pain  Stress fracture of hip  Left rotator cuff tear, Schedule this test, [facility]  Office/Outpatient Visit Level 4 Established [id number] PC, Left hip pain  Stress fracture of hip  Left rotator cuff tear, [facility], 06/21/21 16:04:00 CDT  ?  3.?Left rotator cuff tear?M75.102  Ordered:  1036F Current  tobacco non-user, 06/21/21 16:04:00 CDT  1111F- Medication reconciliation completed within 30 days of an inpatient discharge to home, 06/21/21 16:04:00 CDT  MRI Ext Lower Joint Left W/O Contrast, Routine, 06/21/21 16:05:00 CDT, Other (please specify), None, Ambulatory, mri left hip - stress fx, Rad Type, Order for future visit, Left hip pain  Stress fracture of hip  Left rotator cuff tear, Schedule this test, [facility]...  MRI Ext Upper Joint Left W/O Contrast, Routine, 06/21/21 16:04:00 CDT, Other (please specify), None, Ambulatory, mri left shoulder- rtc tear, Rad Type, Order for future visit, Left hip pain  Stress fracture of hip  Left rotator cuff tear, Schedule this test, [facility]...  Office/Outpatient Visit Level 4 Established [id number] PC, Left hip pain  Stress fracture of hip  Left rotator cuff tear, [facility], 06/21/21 16:04:00 CDT  ?   Problem List/Past Medical History  Ongoing  Diabetes mellitus  Heart murmur  Hypertension  Obesity  Historical  Heart  Procedure/Surgical History  Injection(s), anesthetic agent(s) and/or steroid; brachial plexus (09/01/2020)  Insertion of Internal Fixation Device into Right Shoulder Joint, Open Approach (09/01/2020)  Introduction of Anesthetic Agent into Peripheral Nerves and Plexi, Percutaneous Approach (09/01/2020)  Repair of ruptured musculotendinous cuff (eg, rotator cuff) open; chronic (09/01/2020)  Repair Right Shoulder Tendon, Open Approach (09/01/2020)  Rotator Cuff Repair (Right) (09/01/2020)  Arthroscopy of Shoulder with Rotator Cuff Repair (Right) (06/16/2020)  Arthroscopy, shoulder, surgical; biceps tenodesis (06/16/2020)  Injection(s), anesthetic agent(s) and/or steroid; brachial plexus (06/16/2020)  Introduction of Anesthetic Agent into Peripheral Nerves and Plexi, Percutaneous Approach (06/16/2020)  Repair of ruptured musculotendinous cuff (eg, rotator cuff) open; acute (06/16/2020)  Repair Right  Shoulder Tendon, Open Approach (06/16/2020)  Reposition Right Upper Arm Tendon, Percutaneous Endoscopic Approach (06/16/2020)  Repair Face Subcutaneous Tissue and Fascia, Open Approach (02/08/2020)  Repair Left Ear Skin, External Approach (02/08/2020)  Simple repair of superficial wounds of face, ears, eyelids, nose, lips and/or mucous membranes; 7.6 cm to 12.5 cm (02/08/2020)  JOSHUA BOSTON Total Knee Arthroplasty (Left) (05/14/2019)  Replacement of Left Knee Joint with Synthetic Substitute, Cemented, Open Approach (05/14/2019)  Robotic Assisted Procedure of Lower Extremity, Open Approach (05/14/2019)  Leg  ORIF lt ankle  Tonsillectomy  Torn meniscus   Medications  hydrochlorothiazide-losartan 25 mg-100 mg oral tablet, 1 tab(s), Oral, Daily  meloxicam 7.5 mg oral tablet, 7.5 mg= 1 tab(s), Oral, Daily  metFORMIN 500 mg oral tablet, extended release, 500 mg= 1 tab(s), Oral, Daily  metoprolol succinate 50 mg oral tablet, extended release, 50 mg= 1 tab(s), Oral, Daily  Norco 7.5 mg-325 mg oral tablet, 1 tab(s), Oral, TID, PRN  Allergies  morphine?(Nausea and vomiting)  penicillin?(Shortness of breath)  Social History  Abuse/Neglect  No, 06/21/2021  Alcohol  Current, Liquor, 1-2 times per week, 02/08/2020  Employment/School  Retired, 05/08/2019  Home/Environment  Lives with Spouse., 05/08/2019  Nutrition/Health  Diabetic, 05/08/2019  Substance Use  Never, 05/08/2019  Tobacco  Never (less than 100 in lifetime), N/A, 06/21/2021  Family History  Stroke: Mother and Father.  Immunizations  Vaccine Date Status   COVID-19 MRNA, LNP-S, PF- Pfizer 02/05/2021 Given   COVID-19 MRNA, LNP-S, PF- Pfizer 01/15/2021 Given   tetanus/diphtheria/pertussis, acel(Tdap) 02/08/2020 Given   Health Maintenance  Health Maintenance  ???Pending?(in the next year)  ??? ??OverDue  ??? ? ? ?Aspirin Therapy for CVD Prevention due??05/16/20??and every 1??year(s)  ??? ? ? ?Diabetes Maintenance-HgbA1c due??08/21/20??and every 1??year(s)  ??? ? ? ?Influenza  Vaccine due??10/01/20??and every 1??day(s)  ??? ? ? ?Obesity Screening due??01/01/21??and every 1??year(s)  ??? ? ? ?Advance Directive due??01/02/21??and every 1??year(s)  ??? ? ? ?Cognitive Screening due??01/02/21??and every 1??year(s)  ??? ? ? ?Fall Risk Assessment due??01/02/21??and every 1??year(s)  ??? ? ? ?Functional Assessment due??01/02/21??and every 1??year(s)  ??? ? ? ?Hypertension Management-BMP due??06/04/21??and every 1??year(s)  ??? ? ? ?Diabetes Maintenance-Serum Creatinine due??06/04/21??and every 1??year(s)  ??? ??Due?  ??? ? ? ?Coronary Artery Disease Maintenance-Antiplatelet Agent Prescribed due??06/22/21??Unknown Frequency  ??? ? ? ?Depression Screening due??06/22/21??Unknown Frequency  ??? ? ? ?Diabetes Maintenance-Eye Exam due??06/22/21??Unknown Frequency  ??? ? ? ?Diabetes Maintenance-Fasting Lipid Profile due??06/22/21??Variable frequency  ??? ? ? ?Diabetes Maintenance-Foot Exam due??06/22/21??Unknown Frequency  ??? ? ? ?Hypertension Management-Education due??06/22/21??and every 1??year(s)  ??? ? ? ?Medicare Annual Wellness Exam due??06/22/21??and every 1??year(s)  ??? ? ? ?Pneumococcal Vaccine due??06/22/21??Unknown Frequency  ??? ? ? ?Zoster Vaccine due??06/22/21??Unknown Frequency  ??? ??Due In Future?  ??? ? ? ?Blood Pressure Screening not due until??06/21/22??and every 1??year(s)  ??? ? ? ?Body Mass Index Check not due until??06/21/22??and every 1??year(s)  ??? ? ? ?Hypertension Management-Blood Pressure not due until??06/21/22??and every 1??year(s)  ??? ? ? ?ADL Screening not due until??06/21/22??and every 1??year(s)  ???Satisfied?(in the past 1 year)  ??? ??Satisfied?  ??? ? ? ?ADL Screening on??06/21/21.??Satisfied by Alina Garcia  ??? ? ? ?Advance Directive on??09/01/20.??Satisfied by Terra ROBIN, Carolina NUNES.  ??? ? ? ?Blood Pressure Screening on??06/21/21.??Satisfied by Hadley ROBIN, Syeda MATTHEW  ??? ? ? ?Body Mass Index Check on??12/21/20.??Satisfied by Twyla Pantoja LPN  TIFF.  ??? ? ? ?Fall Risk Assessment on??12/21/20.??Satisfied by Twyla Pantoja LPN  ??? ? ? ?Functional Assessment on??09/01/20.??Satisfied by Terra ROBIN, Carolina NUNES.  ??? ? ? ?Hypertension Management-Blood Pressure on??06/21/21.??Satisfied by Hadley ROBIN, Syeda MATTHEW  ??? ? ? ?Obesity Screening on??12/21/20.??Satisfied by Twyla Pantoja LPN  ?

## 2022-05-02 NOTE — HISTORICAL [facility]
This is a historical note converted from Dorian. Formatting and pictures may have been removed.  Please reference Dorian for original formatting and attached multimedia. Chief Complaint  Left knee pain. Seen by Dr. Baltazar Sevilla.  History of Present Illness  Patient comes in today for his first visit. ?Patient complains of left knee pain?swelling loss of motion. ?He is present here with family. ?Patient has pain with daily activities, worsening over the last 5+ years. ?He had previous injections last year?though?they continue to wear off. ?He denies any new trauma.? He has thought about it and discuss with his friends in states he is now ready to proceed with a knee replacement.  Physical Exam  Vitals & Measurements  HR:?67(Peripheral)? BP:?130/78?  HT:?180?cm? WT:?111?kg? BMI:?34.26?  Patient is well-developed male he is awake alert and oriented x3 he is in apparent distress he is pleasant and cooperative. ?Examination left lower extremity form soft and warm. ?Skin is intact with no signs or symptoms of DVT or infection. ?He does walk with antalgic gait there is a varus deformity he is almost correctable to neutral. ?His motion is 5-105 degrees. ?He is stable to stressing otherwise.? Positive patella grind negative apprehension he is neurovascular intact distally. ?X-rays 3 views left?knee demonstrates bone-on-bone arthritis medial compartment, patellofemoral?arthritic changes.  Assessment/Plan  1.?Osteoarthritis of right knee  ? At this time we discussed his physical exam and x-ray findings. ?We have discussed his underlying arthritis of the left knee. ?We have discussed the bone-on-bone. ?We have discussed additional conservative treatments as well as surgical invention.? He will proceed with a total joints class.? I would like to see him back in 2 weeks for possible preop exam?and appointment for a left total knee arthroplasty.  Ordered:  3008F Body Mass Index (BMI), documented, 04/15/19 15:54:00 CDT  3075F Most  recent systolic blood pressure, 130 to 139 mm Hg, 04/15/19 15:54:00 CDT  3078F Most recent diastolic blood pressure, less than 80 mm Hg, 04/15/19 15:54:00 CDT  4008F Beta-blocker prescribed or currently being taken, 04/15/19 15:54:00 CDT  Clinic Follow up, *Est. 04/29/19 3:00:00 CDT, Order for future visit, Osteoarthritis of right knee  Varus deformity of knee, Orthopaedics  Office/Outpatient Visit Level 3 New [id number], Osteoarthritis of right knee  Varus deformity of knee, Orthopaedics Clinic, 04/15/19 15:54:00 CDT  Schedule Surgery, right knee TKA with micky, preop 2 wks, 04/15/19 15:54:00 CDT, Osteoarthritis of right knee  Varus deformity of knee  ?  2.?Varus deformity of knee  Ordered:  3008F Body Mass Index (BMI), documented, 04/15/19 15:54:00 CDT  3075F Most recent systolic blood pressure, 130 to 139 mm Hg, 04/15/19 15:54:00 CDT  3078F Most recent diastolic blood pressure, less than 80 mm Hg, 04/15/19 15:54:00 CDT  4008F Beta-blocker prescribed or currently being taken, 04/15/19 15:54:00 CDT  Clinic Follow up, *Est. 04/29/19 3:00:00 CDT, Order for future visit, Osteoarthritis of right knee  Varus deformity of knee, Orthopaedics  Office/Outpatient Visit Level 3 New [id number] [facility], Osteoarthritis of right knee  Varus deformity of knee, Orthopaedics Clinic, 04/15/19 15:54:00 CDT  Schedule Surgery, right knee TKA with micky, preop 2 wks, 04/15/19 15:54:00 CDT, Osteoarthritis of right knee  Varus deformity of knee  ?   Problem List/Past Medical History  Ongoing  Obesity  Historical  Diabetes mellitus  Heart  Hypertension  Procedure/Surgical History  Leg  Tonsillectomy  Torn meniscus   Medications  alfuzosin 10 mg oral tablet, extended release, 10 mg= 1 tab(s), Oral, Daily  hydrochlorothiazide-losartan 25 mg-100 mg oral tablet, 1 tab(s), Oral, Daily  meloxicam 7.5 mg oral tablet, 7.5 mg= 1 tab(s), Oral, Daily  metFORMIN 500 mg oral tablet, extended release, 500 mg= 1 tab(s), Oral, Daily  metoprolol succinate  50 mg oral capsule, extended release, 50 mg= 1 cap(s), Oral, BID  Allergies  penicillin?(Shortness of breath)  Social History  Alcohol  Current, 04/15/2019  Tobacco  Never (less than 100 in lifetime), No, 04/15/2019  Family History  Stroke: Mother and Father.  Health Maintenance  Health Maintenance  ???Pending?(in the next year)  ??? ??Due?  ??? ? ? ?ADL Screening due??04/16/19??and every 1??year(s)  ??? ? ? ?Advance Directive due??04/16/19??and every 1??year(s)  ??? ? ? ?Alcohol Misuse Screening due??04/16/19??and every 1??year(s)  ??? ? ? ?Aspirin Therapy for CVD Prevention due??04/16/19??and every 1??year(s)  ??? ? ? ?Cognitive Screening due??04/16/19??and every 1??year(s)  ??? ? ? ?Colorectal Screening (Senior Wellness) due??04/16/19??and every?  ??? ? ? ?Fall Risk Assessment due??04/16/19??and every 1??year(s)  ??? ? ? ?Functional Assessment due??04/16/19??and every 1??year(s)  ??? ? ? ?Geriatric Depression Screening due??04/16/19??and every 1??year(s)  ??? ? ? ?Pneumococcal Vaccine due??04/16/19??Variable frequency  ??? ? ? ?Pneumococcal Vaccine due??04/16/19??and every?  ??? ? ? ?Tetanus Vaccine due??04/16/19??and every 10??year(s)  ??? ? ? ?Zoster Vaccine due??04/16/19??and every 100??year(s)  ??? ??Due In Future?  ??? ? ? ?Obesity Screening not due until??04/15/20??and every 1??year(s)  ???Satisfied?(in the past 1 year)  ??? ??Satisfied?  ??? ? ? ?Blood Pressure Screening on??04/15/19.??Satisfied by Chasidy Stanley  ??? ? ? ?Body Mass Index Check on??04/15/19.??Satisfied by Chasidy Stanley  ??? ? ? ?Obesity Screening on??04/15/19.??Satisfied by Chasidy Stanley  ?  ?

## 2022-07-11 ENCOUNTER — OFFICE VISIT (OUTPATIENT)
Dept: ORTHOPEDICS | Facility: CLINIC | Age: 77
End: 2022-07-11
Payer: MEDICARE

## 2022-07-11 VITALS — BODY MASS INDEX: 34.36 KG/M2 | WEIGHT: 240 LBS | HEIGHT: 70 IN

## 2022-07-11 DIAGNOSIS — M17.11 LOCALIZED OSTEOARTHRITIS OF RIGHT KNEE: Primary | ICD-10-CM

## 2022-07-11 PROCEDURE — 99213 PR OFFICE/OUTPT VISIT, EST, LEVL III, 20-29 MIN: ICD-10-PCS | Mod: ,,, | Performed by: ORTHOPAEDIC SURGERY

## 2022-07-11 PROCEDURE — 99213 OFFICE O/P EST LOW 20 MIN: CPT | Mod: ,,, | Performed by: ORTHOPAEDIC SURGERY

## 2022-07-11 RX ORDER — LOSARTAN POTASSIUM AND HYDROCHLOROTHIAZIDE 25; 100 MG/1; MG/1
1 TABLET ORAL DAILY
COMMUNITY

## 2022-07-11 RX ORDER — CLOPIDOGREL BISULFATE 75 MG/1
75 TABLET ORAL
COMMUNITY
Start: 2021-11-23

## 2022-07-11 RX ORDER — METOPROLOL SUCCINATE 50 MG/1
50 TABLET, EXTENDED RELEASE ORAL
COMMUNITY

## 2022-07-11 RX ORDER — METFORMIN HYDROCHLORIDE 500 MG/1
500 TABLET, EXTENDED RELEASE ORAL DAILY
COMMUNITY
Start: 2022-06-27

## 2022-07-11 RX ORDER — NAPROXEN SODIUM 220 MG/1
1 TABLET, FILM COATED ORAL DAILY
COMMUNITY
Start: 2021-11-23 | End: 2022-11-23

## 2022-07-11 RX ORDER — HYDROCHLOROTHIAZIDE 25 MG/1
25 TABLET ORAL DAILY
COMMUNITY
Start: 2022-07-04

## 2022-07-11 RX ORDER — AMLODIPINE BESYLATE 2.5 MG/1
2.5 TABLET ORAL DAILY
COMMUNITY
Start: 2022-07-08

## 2022-07-11 RX ORDER — MELOXICAM 7.5 MG/1
7.5 TABLET ORAL
COMMUNITY

## 2022-07-11 NOTE — PROGRESS NOTES
Subjective:    CC: Follow-up (F/U AFTER INJECTION RIGHT KNEE 3 MONTHS AGO, STATES IT GAVE HIM GOOD RELIEF AFTER 3 DAYS OF INJECTION. )       HPI:  Patient returns today for repeat exam.  Patient states his knee is doing very well, is not really having any discomfort in his knee today.  He does have a history of bone-on-bone arthritis medial compartment.  It injection at last visit.    ROS: Refer to HPI for pertinent ROS. All other 12 point systems negative.    Objective:    Physical Exam:  Right lower extremity compartment soft and warm.  Skin is intact.  There is no signs symptoms of DVT or infection.  He does have a varus deformity walks a slight hesitant gait his motion today is 5-110 degrees he is stable to stressing he is nontender, neurovascular intact distally.    Images: . Images Reviewed and discussed with patient.    Assessment:  1. Localized osteoarthritis of right knee        Plan:  At this time we discussed his physical exam and previous imaging findings.  He is improving nicely with conservative treatment.  I would like see back in 3 months if he is progressing.  We have discussed repeat injection versus surgical intervention if needed.    Follow UP: Follow up in about 3 months (around 10/11/2022).

## 2022-10-13 ENCOUNTER — OFFICE VISIT (OUTPATIENT)
Dept: ORTHOPEDICS | Facility: CLINIC | Age: 77
End: 2022-10-13
Payer: MEDICARE

## 2022-10-13 VITALS
HEART RATE: 74 BPM | SYSTOLIC BLOOD PRESSURE: 190 MMHG | HEIGHT: 70 IN | WEIGHT: 250 LBS | BODY MASS INDEX: 35.79 KG/M2 | DIASTOLIC BLOOD PRESSURE: 95 MMHG

## 2022-10-13 DIAGNOSIS — M17.11 LOCALIZED OSTEOARTHRITIS OF RIGHT KNEE: Primary | ICD-10-CM

## 2022-10-13 PROCEDURE — 99213 OFFICE O/P EST LOW 20 MIN: CPT | Mod: ,,, | Performed by: ORTHOPAEDIC SURGERY

## 2022-10-13 PROCEDURE — 99213 PR OFFICE/OUTPT VISIT, EST, LEVL III, 20-29 MIN: ICD-10-PCS | Mod: ,,, | Performed by: ORTHOPAEDIC SURGERY

## 2022-10-13 NOTE — PROGRESS NOTES
Subjective:    CC: Follow-up of the Right Knee (F/U for right knee. Stabbing pains sometimes. Little swelling sometimes.)       HPI:  Patient returns today for repeat exam.  Patient injection 3 months ago in his right knee.  He states he continues do well.  He has been active.    ROS: Refer to HPI for pertinent ROS. All other 12 point systems negative.    Objective:    Physical Exam:  The patient is well-nourished, well-developed and in no apparent distress, pleasant and cooperative. Examination of the right lower extremity compartments are soft and warm. Skin is intact. There are no signs or symptoms of DVT or infection. There is no obvious joint effusion. There is no erythema. Tender to palpation along the medial joint line and patellofemoral joint , right knee range of motion is 5-110. The knee is stable to exam with varus and valgus stressing. Negative anterior and posterior drawer. Negative Lachman´s.  Negative Sridevi's test. Patella grind is positive, Negative for apprehension. Neurovascularly intact distally.    Images: . Images Reviewed and discussed with patient.    Assessment:  1. Localized osteoarthritis of right knee        Plan:  At this time we discussed his physical exam and previous imaging findings.  He is improving nicely with conservative treatment.  I would like see back in 3 months for possible repeat injection, we will hold off on surgery at this time.    Follow UP: Follow up in about 3 months (around 1/13/2023).

## 2023-07-20 ENCOUNTER — TELEPHONE (OUTPATIENT)
Dept: ORTHOPEDICS | Facility: CLINIC | Age: 78
End: 2023-07-20
Payer: MEDICARE

## 2023-07-20 NOTE — TELEPHONE ENCOUNTER
Patient called stating that he would like to schedule a repeat right knee injection with Dr. العراقي.     I called the patient back to get him scheduled. I scheduled him for 07/27/23 @ 9:45.     The patient verbalized a clear understanding.

## 2023-07-27 ENCOUNTER — HOSPITAL ENCOUNTER (OUTPATIENT)
Dept: RADIOLOGY | Facility: CLINIC | Age: 78
Discharge: HOME | End: 2023-07-27
Attending: ORTHOPAEDIC SURGERY
Payer: MEDICARE

## 2023-07-27 ENCOUNTER — OFFICE VISIT (OUTPATIENT)
Dept: ORTHOPEDICS | Facility: CLINIC | Age: 78
End: 2023-07-27
Payer: MEDICARE

## 2023-07-27 VITALS — WEIGHT: 250 LBS | BODY MASS INDEX: 35.79 KG/M2 | HEIGHT: 70 IN

## 2023-07-27 DIAGNOSIS — M17.11 LOCALIZED OSTEOARTHRITIS OF RIGHT KNEE: Primary | ICD-10-CM

## 2023-07-27 DIAGNOSIS — M25.811 IMPINGEMENT OF RIGHT SHOULDER: ICD-10-CM

## 2023-07-27 DIAGNOSIS — M17.11 LOCALIZED OSTEOARTHRITIS OF RIGHT KNEE: ICD-10-CM

## 2023-07-27 PROCEDURE — 20610 LARGE JOINT ASPIRATION/INJECTION: R SUBACROMIAL BURSA: ICD-10-PCS | Mod: RT,,, | Performed by: ORTHOPAEDIC SURGERY

## 2023-07-27 PROCEDURE — 73564 X-RAY EXAM KNEE 4 OR MORE: CPT | Mod: RT,,, | Performed by: ORTHOPAEDIC SURGERY

## 2023-07-27 PROCEDURE — 20610 DRAIN/INJ JOINT/BURSA W/O US: CPT | Mod: RT,,, | Performed by: ORTHOPAEDIC SURGERY

## 2023-07-27 PROCEDURE — 99214 OFFICE O/P EST MOD 30 MIN: CPT | Mod: 25,,, | Performed by: ORTHOPAEDIC SURGERY

## 2023-07-27 PROCEDURE — 99214 PR OFFICE/OUTPT VISIT, EST, LEVL IV, 30-39 MIN: ICD-10-PCS | Mod: 25,,, | Performed by: ORTHOPAEDIC SURGERY

## 2023-07-27 PROCEDURE — 73564 XR KNEE COMP 4 OR MORE VIEWS RIGHT: ICD-10-PCS | Mod: RT,,, | Performed by: ORTHOPAEDIC SURGERY

## 2023-07-27 RX ORDER — LIDOCAINE HYDROCHLORIDE 20 MG/ML
3 INJECTION, SOLUTION INFILTRATION; PERINEURAL
Status: DISCONTINUED | OUTPATIENT
Start: 2023-07-27 | End: 2023-07-27 | Stop reason: HOSPADM

## 2023-07-27 RX ORDER — BETAMETHASONE SODIUM PHOSPHATE AND BETAMETHASONE ACETATE 3; 3 MG/ML; MG/ML
12 INJECTION, SUSPENSION INTRA-ARTICULAR; INTRALESIONAL; INTRAMUSCULAR; SOFT TISSUE
Status: DISCONTINUED | OUTPATIENT
Start: 2023-07-27 | End: 2023-07-27 | Stop reason: HOSPADM

## 2023-07-27 RX ADMIN — LIDOCAINE HYDROCHLORIDE 3 MG: 20 INJECTION, SOLUTION INFILTRATION; PERINEURAL at 09:07

## 2023-07-27 RX ADMIN — BETAMETHASONE SODIUM PHOSPHATE AND BETAMETHASONE ACETATE 12 MG: 3; 3 INJECTION, SUSPENSION INTRA-ARTICULAR; INTRALESIONAL; INTRAMUSCULAR; SOFT TISSUE at 09:07

## 2023-07-27 NOTE — PROGRESS NOTES
"Subjective:    CC: Pain of the Right Knee and Pain (R knee inj 10/13/22 - pt states that he fell a few weeks ago carrying dog food. he states that he fell on his knee causing pain on the inside of his knee)       HPI:  Patient returns today complaining of right shoulder and knee pain.  Patient states he fell on his shoulder, further aggravating his shoulder, he does have a history arthritis, rotator cuff tears.  He also has a history of longstanding arthritis of the right knee especially long standing walking and bending.    ROS: Refer to HPI for pertinent ROS. All other 12 point systems negative.    Objective:  Vitals:    07/27/23 0946   Weight: 113.4 kg (250 lb)   Height: 5' 10" (1.778 m)        Physical Exam:  Patient is well-nourished and well-developed, in no apparent distress, pleasant and cooperative. Examination of the right upper extremity compartments are soft and warm.  Skin is intact. There are no signs or symptoms of DVT or infection.   Patient is tender to palpation along the  anterolateral aspect .  Patient is able to forward flex and abduct to 105.  Positive Morris and Neers, has a empty can, negative drop arm test. Negative sulcus sign. Stable to stressing. Neurovascularly intact distally.  Examination of the right knee is tender along the medial joint line there is patellofemoral crepitus no joint effusion, varus deformity his motion is 0-100 degrees he is otherwise stable to stressing, neurovascular intact distally.  There is no signs symptoms of DVT or infection.    Images:  X-rays four views right knee demonstrates bone-on-bone arthritis in the medial and patellofemoral compartments. Images Reviewed and discussed with patient.    Assessment:  1. Localized osteoarthritis of right knee  - X-Ray Knee Complete 4 Or More Views Right; Future    2. Impingement of right shoulder  - Large Joint Aspiration/Injection: R subacromial bursa        Plan:  At this time we discussed his physical exam and x-ray " findings.  Patient is slightly more symptomatic about his right shoulder, he tolerated his subacromial injection very well to the right shoulder.  Regards to his right knee we have discussed a knee brace, knee exercises.  We have also discussed a total knee arthroplasty, he is done very well in the left side.  I would like see back in 3 months to see how    Follow UP: No follow-ups on file.    Large Joint Aspiration/Injection: R subacromial bursa    Date/Time: 7/27/2023 9:45 AM  Performed by: Baltazar العراقي MD  Authorized by: Baltazar العراقي MD     Consent Done?:  Yes (Verbal)  Indications:  Pain  Site marked: the procedure site was marked    Timeout: prior to procedure the correct patient, procedure, and site was verified    Prep: patient was prepped and draped in usual sterile fashion    Approach:  Posterior  Location:  Shoulder  Site:  R subacromial bursa  Medications:  12 mg betamethasone acetate-betamethasone sodium phosphate 6 mg/mL; 3 mg LIDOcaine HCL 20 mg/ml (2%) 20 mg/mL (2 %)  Patient tolerance:  Patient tolerated the procedure well with no immediate complications

## 2023-07-27 NOTE — PROCEDURES
Large Joint Aspiration/Injection: R subacromial bursa    Date/Time: 7/27/2023 9:45 AM  Performed by: Baltazar العراقي MD  Authorized by: Baltazar العراقي MD     Consent Done?:  Yes (Verbal)  Indications:  Pain  Site marked: the procedure site was marked    Timeout: prior to procedure the correct patient, procedure, and site was verified    Prep: patient was prepped and draped in usual sterile fashion    Approach:  Posterior  Location:  Shoulder  Site:  R subacromial bursa  Medications:  12 mg betamethasone acetate-betamethasone sodium phosphate 6 mg/mL; 3 mg LIDOcaine HCL 20 mg/ml (2%) 20 mg/mL (2 %)  Patient tolerance:  Patient tolerated the procedure well with no immediate complications

## 2023-09-01 ENCOUNTER — TELEPHONE (OUTPATIENT)
Dept: ORTHOPEDICS | Facility: CLINIC | Age: 78
End: 2023-09-01
Payer: MEDICARE

## 2023-09-01 NOTE — TELEPHONE ENCOUNTER
Patient called and stated that he hit his leg with a pipe right below his total knee replacement.   Swelling. And difficulty putting weight on it.     Requested to come in for xray.      Advised that we do not have a physician here to read xr today, but he can go to an urgent care. He stated that he would go to the orthopedic urgent care on [address] and if he needed follow up then he would call.     Pt verbalized understanding and will call with any questions or concerns.

## 2023-09-14 ENCOUNTER — OFFICE VISIT (OUTPATIENT)
Dept: ORTHOPEDICS | Facility: CLINIC | Age: 78
End: 2023-09-14
Payer: MEDICARE

## 2023-09-14 ENCOUNTER — HOSPITAL ENCOUNTER (OUTPATIENT)
Dept: RADIOLOGY | Facility: CLINIC | Age: 78
Discharge: HOME | End: 2023-09-14
Attending: ORTHOPAEDIC SURGERY
Payer: MEDICARE

## 2023-09-14 VITALS
SYSTOLIC BLOOD PRESSURE: 192 MMHG | DIASTOLIC BLOOD PRESSURE: 89 MMHG | HEART RATE: 89 BPM | HEIGHT: 70 IN | BODY MASS INDEX: 35.79 KG/M2 | WEIGHT: 250 LBS

## 2023-09-14 DIAGNOSIS — M89.8X6 PAIN IN LEFT TIBIA: ICD-10-CM

## 2023-09-14 DIAGNOSIS — Z96.652 HISTORY OF TOTAL KNEE ARTHROPLASTY, LEFT: ICD-10-CM

## 2023-09-14 DIAGNOSIS — T14.8XXA BONE BRUISE: ICD-10-CM

## 2023-09-14 DIAGNOSIS — M89.8X6 PAIN IN LEFT TIBIA: Primary | ICD-10-CM

## 2023-09-14 PROCEDURE — 73590 X-RAY EXAM OF LOWER LEG: CPT | Mod: LT,,, | Performed by: ORTHOPAEDIC SURGERY

## 2023-09-14 PROCEDURE — 99214 PR OFFICE/OUTPT VISIT, EST, LEVL IV, 30-39 MIN: ICD-10-PCS | Mod: ,,, | Performed by: ORTHOPAEDIC SURGERY

## 2023-09-14 PROCEDURE — 73590 XR TIBIA FIBULA 2 VIEW LEFT: ICD-10-PCS | Mod: LT,,, | Performed by: ORTHOPAEDIC SURGERY

## 2023-09-14 PROCEDURE — 99214 OFFICE O/P EST MOD 30 MIN: CPT | Mod: ,,, | Performed by: ORTHOPAEDIC SURGERY

## 2023-09-14 RX ORDER — LOSARTAN POTASSIUM 100 MG/1
100 TABLET ORAL
COMMUNITY
Start: 2023-07-03

## 2023-09-14 RX ORDER — HYDROCODONE BITARTRATE AND ACETAMINOPHEN 5; 325 MG/1; MG/1
1 TABLET ORAL EVERY 6 HOURS PRN
COMMUNITY
Start: 2023-09-05

## 2023-09-14 RX ORDER — AZITHROMYCIN 250 MG/1
TABLET, FILM COATED ORAL
COMMUNITY
Start: 2023-07-31

## 2023-09-14 NOTE — PROGRESS NOTES
"Subjective:    CC: Knee Pain (was changing a trap on a machine and a bar hit the side of his leg, states its hot and brusied and tender to the touch did have XR done, has been wearing a brace and icing, had previous TKA  SX 5/14/2019)       HPI:  Patient comes in today complaining of left leg pain.  Patient states he was changing a bracket on the side of his machine when it hit his leg.  This was last week.  He had bruising and swelling.  He has been using ice and heat with some help, he is had a previous total knee arthroplasty 4 years ago.    ROS: Refer to HPI for pertinent ROS. All other 12 point systems negative.    Objective:  Vitals:    09/14/23 0833   BP: (!) 192/89   BP Location: Left forearm   Patient Position: Sitting   Pulse: 89   Weight: 113.4 kg (250 lb)   Height: 5' 10" (1.778 m)        Physical Exam:  The patient is well-nourished, well-developed and in no apparent distress, pleasant and cooperative. Examination of the left lower extremity compartments are soft and warm. Skin is intact. There are no signs or symptoms of DVT or infection. There is no obvious joint effusion. There is no erythema. Tender to palpation along the anterior aspect , left knee range of motion is 0-110. The knee is stable to exam with varus and valgus stressing. Negative anterior and posterior drawer. Negative Lachman´s.  Negative Sridevi's test. Patella grind is positive, Negative for apprehension. Neurovascularly intact distally.    Images:  X-rays two views of the left tibia, fibula demonstrate no obvious acute fracture dislocation, healed fracture of the distal tibia retained hardware, total knee arthroplasty without obvious fracture or dislocation. Images Reviewed and discussed with patient.    Assessment:  1. Pain in left tibia  - X-Ray Tibia Fibula 2 View Left; Future    2. History of total knee arthroplasty, left    3. Bone bruise        Plan:  At this time we discussed his physical exam and x-ray findings.  We have " discussed low-impact activities, anti-inflammatories with appropriate precautions.  Patient will call with any change.    Follow UP: No follow-ups on file.

## 2025-03-18 NOTE — OP NOTE
DATE OF SURGERY:    09/01/2020    SURGEON:  Baltazar العراقي MD    SERVICE:  Orthopedics.    PREOPERATIVE DIAGNOSIS:  Right shoulder full-thickness rotator cuff tear with retraction.    POSTOPERATIVE DIAGNOSIS:  Right shoulder full-thickness rotator cuff tear with retraction.    PROCEDURE:  Mini open right shoulder rotator cuff repair.  Full-thickness and retracted.    ANESTHESIA:  LMA.    IV FLUIDS:  As per Anesthesia.    ESTIMATED BLOOD LOSS:  Less than 20 cc.    COMPLICATIONS:  None, stable to PACU.    IMPLANTS:  Arthrex swivel lock suture.    INDICATION:  Mr. Vazquez is a 75-year-old male who had a previous rotator cuff repair last year.  The patient had a re-injury after multiple traumas and was noted to have a drop arm.  He had a repeat MRI demonstrating above findings.  The risks, benefits, and alternatives were discussed with the patient in detail.  All questions were answered.  Informed consent was obtained.    PROCEDURE IN DETAIL:  Patient was found in preoperative holding by Anesthesia and found fit for surgery.  He was taken to the operating room and placed on the operating table in supine position.  All bony prominences were well padded.  Time-out was called to identify correct patient, procedure, correct site, all were in agreement.  Patient underwent LMA anesthesia without complications.  He was then prepped and draped in normal sterile fashion and the right upper extremity exposed for surgery.  He was placed in the modified beach chair position.  He received his preoperative antibiotics.  Next, through his previous mini open incision, a 4 cm incision made over the anterolateral aspect of the right shoulder.  Soft tissue dissection down to the fascia where patient's fascia was then split in line with its fibers, careful not to go greater than 3 cm past the tip of the acromion.  Next, the subacromial space was entered.  A large amount of fluid was removed.  The patient had a full thickness and  retracted rotator cuff tear.  The remaining sutures were then removed.  His supraspinatus for the most part was completely out of view, past the glenoid.  Part of his infraspinatus tendon was identified and was brought back over the humeral head and repaired back down with use of 1 Arthrex suture anchor.  The remaining rotator cuff was already fully retracted and the tendon was not able to be repaired.  He did have some repair of the infraspinatus tendon.  There were no additional tears available.  There was no subacromial impingement.  After this was done, copious irrigation was used to wash the wound.  Hemostasis was achieved.  The fascia was closed with 0 Vicryl, subcutaneous tissues closed with 2-0 Vicryl, skin was closed with 3-0 Monocryl sutures.  Mastisol, Steri-Strips, Xeroform, 4 x 4's, soft tissue dressing and a shoulder abduction sling was placed on the right upper extremity.  He was awoken by Anesthesia and brought to PACU in stable condition.        ______________________________  Baltazar العراقي MD    AK/UR  DD:  09/01/2020  Time:  04:32PM  DT:  09/01/2020  Time:  05:02PM  Job #:  [id number]      Additional Notes (Optional): Benign Detail Level: Detailed Hide Additional Notes?: No